# Patient Record
Sex: FEMALE | Race: BLACK OR AFRICAN AMERICAN | NOT HISPANIC OR LATINO | URBAN - METROPOLITAN AREA
[De-identification: names, ages, dates, MRNs, and addresses within clinical notes are randomized per-mention and may not be internally consistent; named-entity substitution may affect disease eponyms.]

---

## 2024-09-12 ENCOUNTER — HOSPITAL ENCOUNTER (OUTPATIENT)
Facility: HOSPITAL | Age: 24
Discharge: HOME | End: 2024-09-12
Attending: EMERGENCY MEDICINE | Admitting: EMERGENCY MEDICINE
Payer: COMMERCIAL

## 2024-09-12 VITALS
SYSTOLIC BLOOD PRESSURE: 143 MMHG | HEIGHT: 67 IN | RESPIRATION RATE: 16 BRPM | DIASTOLIC BLOOD PRESSURE: 77 MMHG | WEIGHT: 220 LBS | HEART RATE: 75 BPM | TEMPERATURE: 98.1 F | OXYGEN SATURATION: 99 % | BODY MASS INDEX: 34.53 KG/M2

## 2024-09-12 DIAGNOSIS — J02.0 STREP PHARYNGITIS: Primary | ICD-10-CM

## 2024-09-12 LAB
FLUAV SUBTYP SPEC NAA+PROBE: NOT DETECTED
FLUBV RNA ISLT QL NAA+PROBE: NOT DETECTED
SARS-COV-2 RNA RESP QL NAA+PROBE: NOT DETECTED
STREP A PCR: DETECTED

## 2024-09-12 PROCEDURE — G0463 HOSPITAL OUTPT CLINIC VISIT: HCPCS | Performed by: PHYSICIAN ASSISTANT

## 2024-09-12 PROCEDURE — 87636 SARSCOV2 & INF A&B AMP PRB: CPT | Performed by: EMERGENCY MEDICINE

## 2024-09-12 PROCEDURE — 87651 STREP A DNA AMP PROBE: CPT | Performed by: EMERGENCY MEDICINE

## 2024-09-12 RX ORDER — AMOXICILLIN 875 MG
875 TABLET ORAL 2 TIMES DAILY
Qty: 20 TABLET | Refills: 0 | Status: SHIPPED | OUTPATIENT
Start: 2024-09-12

## 2024-09-12 NOTE — Clinical Note
[facility]  [address]  [facility] [address]    Ana Powers was seen and treated in our emergency department on 9/12/2024.  She may return to work on 09/15/2024.         Thank you for choosing [facility].    [facility], Todd, DO

## 2024-09-12 NOTE — Clinical Note
[facility]  [address]  [facility] [address]    Ana Powers was seen and treated in our emergency department on 9/12/2024.  She may return to work on 09/14/2024.         Thank you for choosing [facility].    Michael Dubois III, PA

## 2024-09-12 NOTE — FSED PROVIDER NOTE
EMERGENCY DEPARTMENT ENCOUNTER    Room Number:  11/11  Date seen:  9/12/2024  Time seen: 18:30 EDT  PCP: Provider, No Known  Historian: Patient    Discussed/obtained information from independent historians: N/A    HPI:  Chief complaint: Sore throat  A complete HPI/ROS/PMH/PSH/SH/FH are unobtainable due to: N/A  Context:Ana Powers is a 24 y.o. female who presents to the ED with c/o sore throat this been ongoing for the past 2 to 3 days.  Patient reports she has 2 neighbors that were recently diagnosed with strep.  She took care of them over the weekend.  She has now had a sore throat for the past 2 to 3 days and gradually getting worse.  No fever but she has had chills.  No cough, shortness of breath.  Pain does seem as if it is referred to the right ear.  No headache, neck pain, photophobia, left upper quadrant abdominal pain associated with her symptoms.  Patient is here for further evaluation.      Chronic or social conditions impacting care:    ALLERGIES  Patient has no known allergies.    PAST MEDICAL HISTORY  Active Ambulatory Problems     Diagnosis Date Noted    No Active Ambulatory Problems     Resolved Ambulatory Problems     Diagnosis Date Noted    No Resolved Ambulatory Problems     No Additional Past Medical History       PAST SURGICAL HISTORY  History reviewed. No pertinent surgical history.    FAMILY HISTORY  History reviewed. No pertinent family history.    SOCIAL HISTORY  Social History     Socioeconomic History    Marital status: Single       REVIEW OF SYSTEMS  Review of Systems    All systems reviewed and negative except for those discussed in HPI.     PHYSICAL EXAM    I have reviewed the triage vital signs and nursing notes.  Vitals:    09/12/24 1800   BP: 143/77   Pulse: 75   Resp: 16   Temp: 98.1 °F (36.7 °C)   SpO2: 99%     Physical Exam    GENERAL: WW female, not distressed  HENT: nares patent.  Pharyngeal erythema, tonsillar exudate, anterior cervical adenopathy present.  EYES: no  scleral icterus  NECK: no ROM limitations  CV: regular rhythm, regular rate  RESPIRATORY: normal effort  ABDOMEN: soft  : deferred  MUSCULOSKELETAL: no deformity  NEURO: alert, moves all extremities, follows commands  SKIN: warm, dry    LAB RESULTS  Recent Results (from the past 24 hour(s))   Rapid Strep A Screen - Swab, Throat    Collection Time: 09/12/24  5:57 PM    Specimen: Throat; Swab   Result Value Ref Range    STREP A PCR Detected (A) Not Detected   COVID-19 and FLU A/B PCR, 1 HR TAT - Swab, Nasopharynx    Collection Time: 09/12/24  5:57 PM    Specimen: Nasopharynx; Swab   Result Value Ref Range    COVID19 Not Detected Not Detected - Ref. Range    Influenza A PCR Not Detected Not Detected    Influenza B PCR Not Detected Not Detected       Ordered the above labs and independently interpreted results.  My findings will be discussed in the ED course or medical decision making section below    RADIOLOGY RESULTS  No Radiology Exams Resulted Within Past 24 Hours     Ordered the above noted radiological studies.  Independently interpreted by me.  My findings will be discussed in the medical decision section below.     PROGRESS, DATA ANALYSIS, CONSULTS AND MEDICAL DECISION MAKING    Please note that this section constitutes my independent interpretation of clinical data including lab results, radiology, EKG's.  This constitutes my independent professional opinion regarding differential diagnosis and management of this patient.  It may include any factors such as history from outside sources, review of external records, social determinants of health, management of medications, response to those treatments, and discussions with other providers.    ED Course as of 09/12/24 1836   Thu Sep 12, 2024   1824 STREP A PCR(!): Detected [RC]      ED Course User Index  [RC] Michael Dubois III, PA     Orders placed during this visit:  Orders Placed This Encounter   Procedures    Rapid Strep A Screen - Swab, Throat     COVID-19 and FLU A/B PCR, 1 HR TAT - Swab, Nasopharynx            Medical Decision Making  Amount and/or Complexity of Data Reviewed  Labs:  Decision-making details documented in ED Course.      Strep, mono, other viral bronchitis.  This looks very much like strep.  Will wait for the COVID flu and strep test in triage.  If positive we will go ahead and treat with a penicillin-based antibiotic.  Patient reports no allergies to medication.      DIAGNOSIS  Final diagnoses:   Strep pharyngitis          Medication List        New Prescriptions      amoxicillin 875 MG tablet  Commonly known as: AMOXIL  Take 1 tablet by mouth 2 (Two) Times a Day.               Where to Get Your Medications        These medications were sent to [id number] - [place], [place] - [address] AT [facility] - [phone]  - [phone]   [address], [facility] [address]      Phone: [phone]   amoxicillin 875 MG tablet         FOLLOW-UP  Primary care as needed              Latest Documented Vital Signs:  As of 18:36 EDT  BP- 143/77 HR- 75 Temp- 98.1 °F (36.7 °C) (Temporal) O2 sat- 99%    Appropriate PPE utilized throughout this patient encounter to include mask, if indicated, per current protocol. Hand hygiene was performed before donning PPE and after removal when leaving the room.    Please note that portions of this were completed with a voice recognition program.     Note Disclaimer: At [facility], we believe that sharing information builds trust and better relationships. You are receiving this note because you are receiving care at [facility] or recently visited. It is possible you will see health information before a provider has talked with you about it. This kind of information can be easy to misunderstand. To help you fully understand what it means for your health, we urge you to discuss this note with your provider.

## 2024-09-12 NOTE — DISCHARGE INSTRUCTIONS
Off work for the next 24 hours.  Ibuprofen as needed for pain fever and chills.  Take the the amoxicillin until the course is complete.  Return to the ER with worsening symptoms, should you develop trouble swallowing, or should you have any further concerns.